# Patient Record
Sex: MALE | Race: BLACK OR AFRICAN AMERICAN | NOT HISPANIC OR LATINO | Employment: OTHER | URBAN - METROPOLITAN AREA
[De-identification: names, ages, dates, MRNs, and addresses within clinical notes are randomized per-mention and may not be internally consistent; named-entity substitution may affect disease eponyms.]

---

## 2022-03-09 ENCOUNTER — NEW PATIENT (OUTPATIENT)
Dept: URBAN - METROPOLITAN AREA CLINIC 4 | Facility: CLINIC | Age: 37
End: 2022-03-09

## 2022-03-09 DIAGNOSIS — H00.12: ICD-10-CM

## 2022-03-09 PROCEDURE — 99204 OFFICE O/P NEW MOD 45 MIN: CPT

## 2022-03-09 ASSESSMENT — VISUAL ACUITY
OS_SC: 20/20-2
OD_SC: 20/25

## 2022-03-09 ASSESSMENT — TONOMETRY
OS_IOP_MMHG: 20
OD_IOP_MMHG: 17

## 2022-03-09 NOTE — PATIENT DISCUSSION
Discussed diagnosis in detail with patient. Discussed treatment options with patient. Patient instructed to apply warm compresses to the lids followed by lid massage. Jamison's baby shampoo lash rinse advised. New medication(s) Rx given today. Medication instillation reviewed and understood. Emphasized and explained compliance. Will continue to observe condition and or symptoms. Discussed risks of progression.

## 2022-03-09 NOTE — PATIENT DISCUSSION
SEND MAXITROL OINTMENT TO [facility] ON [facility] PHOSPHATE. DO WARM COMPRESSES/ LID SCRUBS/OINTMENT.